# Patient Record
Sex: MALE | HISPANIC OR LATINO | URBAN - NONMETROPOLITAN AREA
[De-identification: names, ages, dates, MRNs, and addresses within clinical notes are randomized per-mention and may not be internally consistent; named-entity substitution may affect disease eponyms.]

---
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
## 2022-01-29 NOTE — PATIENT DISCUSSION
Discussed this has been a long term condition and patient said today isn't an emergency but annoying and this is when they (us) placed her in, will rto full exam.

## 2022-03-16 NOTE — PATIENT DISCUSSION
Explained that floaters will likely decrease in severity with age and ongoing vitreous liquefaction.

## 2022-03-16 NOTE — PATIENT DISCUSSION
Recommended laser today, Patient consented and tolerated procedure well. There were no complications. Post-op instructions given. Patient given office phone number/answering service number and advised to call immediately should there be loss of vision or pain, or should they have any other questions or concerns.

## 2022-03-16 NOTE — PROCEDURE NOTE: CLINICAL
PROCEDURE NOTE: Laser for Retinal Tear OS. Diagnosis: Horseshoe Tear of Retina Without Detachment. Anesthesia: Topical. Prior to laser, risks/benefits/alternatives to laser discussed including loss of vision, decreased peripheral and night vision, need for more laser and/or surgery and patient wished to proceed. A written consent is on file, and the need for today’s laser was discussed and the patient is understanding and wishes to proceed. Laser Lens: superquad. Wavelength: Argon Green. Spot size: 100 um. Pulse power: 360 mW. Number of pulses: 20. Patient tolerated procedure well. There were no complications. Post-op instructions given. Patient given office phone number/answering service number and advised to call immediately should there be loss of vision or pain, or should they have any other questions or concerns. Cinthya Puente

## 2022-04-01 NOTE — PROCEDURE NOTE: CLINICAL
PROCEDURE NOTE: Laser for Retinal Tear OS. Diagnosis: Horseshoe Tear of Retina Without Detachment. Anesthesia: Topical. Prior to laser, risks/benefits/alternatives to laser discussed including loss of vision, decreased peripheral and night vision, need for more laser and/or surgery and patient wished to proceed. A written consent is on file, and the need for today’s laser was discussed and the patient is understanding and wishes to proceed. Laser Lens: focal. Wavelength: Argon Green. Spot size: 100 um. Pulse power: 300 mW. Number of pulses: 15. Patient tolerated procedure well. There were no complications. Post-op instructions given. Patient given office phone number/answering service number and advised to call immediately should there be loss of vision or pain, or should they have any other questions or concerns. Gretchen Mcgraw

## 2022-04-10 ASSESSMENT — VISUAL ACUITY
OU_SC: 20/30
OD_SC: 20/30
OS_SC: 20/40
OS_SC: 20/40
OD_SC: 20/30
OU_SC: J2
OU_SC: J2
OD_SC: 20/30
OU_SC: 20/30
OU_SC: J2
OU_SC: 20/30
OD_SC: 20/30+2
OS_SC: 20/30-2
OU_CC: 20/20-
OS_SC: 20/40

## 2023-02-02 NOTE — PATIENT DISCUSSION
Advised to call immediately if any worsening distortion or blurring is noted.
Expect that vitreous opacities will resolve with time.
Glasses Rx given.
No new retinal tears seen on exam.
Observe.
Patient given Rx for glasses.
Recommend observation.
Recommended observation.
Retinal detachment warnings given.
Retinal tear and detachment warning symptoms reviewed and patient instructed to call immediately if increasing floaters, flashes, or decreasing peripheral vision.
Stable.
Vitreous opacities are not visually significant.
Well lasered, retina attached.
Will add more laser anterior. return for tx only.
resolving VH, improved.
No